# Patient Record
Sex: MALE | Race: WHITE | NOT HISPANIC OR LATINO | Employment: FULL TIME | URBAN - NONMETROPOLITAN AREA
[De-identification: names, ages, dates, MRNs, and addresses within clinical notes are randomized per-mention and may not be internally consistent; named-entity substitution may affect disease eponyms.]

---

## 2020-07-03 ENCOUNTER — VIRTUAL VISIT (OUTPATIENT)
Dept: FAMILY MEDICINE | Facility: OTHER | Age: 25
End: 2020-07-03
Attending: NURSE PRACTITIONER

## 2020-07-03 VITALS — HEIGHT: 74 IN | BODY MASS INDEX: 21.82 KG/M2 | WEIGHT: 170 LBS

## 2020-07-03 DIAGNOSIS — Z20.822 ENCOUNTER FOR LABORATORY TESTING FOR COVID-19 VIRUS: Primary | ICD-10-CM

## 2020-07-03 DIAGNOSIS — Z53.9 ERRONEOUS ENCOUNTER--DISREGARD: ICD-10-CM

## 2020-07-03 SDOH — HEALTH STABILITY: MENTAL HEALTH: HOW MANY STANDARD DRINKS CONTAINING ALCOHOL DO YOU HAVE ON A TYPICAL DAY?: 5 OR 6

## 2020-07-03 SDOH — HEALTH STABILITY: MENTAL HEALTH: HOW OFTEN DO YOU HAVE A DRINK CONTAINING ALCOHOL?: 2-4 TIMES A MONTH

## 2020-07-03 SDOH — HEALTH STABILITY: MENTAL HEALTH: HOW OFTEN DO YOU HAVE 6 OR MORE DRINKS ON ONE OCCASION?: MONTHLY

## 2020-07-03 ASSESSMENT — MIFFLIN-ST. JEOR: SCORE: 1835.86

## 2020-07-03 NOTE — NURSING NOTE
"Chief Complaint   Patient presents with     Suspected Covid     exposed at work, can't go back until tested         Initial Ht 1.88 m (6' 2\")   Wt 77.1 kg (170 lb)   BMI 21.83 kg/m   Estimated body mass index is 21.83 kg/m  as calculated from the following:    Height as of this encounter: 1.88 m (6' 2\").    Weight as of this encounter: 77.1 kg (170 lb).    Medication Reconciliation: complete      Doris Brown  "

## 2020-07-03 NOTE — PROGRESS NOTES
Unable to reach patient by phone to complete phone visit.  Visit cancelled.    This encounter was opened in error. Please disregard.  This encounter was opened in error. Please disregard.

## 2020-07-04 ENCOUNTER — ALLIED HEALTH/NURSE VISIT (OUTPATIENT)
Dept: FAMILY MEDICINE | Facility: OTHER | Age: 25
End: 2020-07-04
Attending: NURSE PRACTITIONER
Payer: OTHER GOVERNMENT

## 2020-07-04 VITALS — WEIGHT: 170 LBS | BODY MASS INDEX: 21.83 KG/M2

## 2020-07-04 DIAGNOSIS — Z20.822 COVID-19 RULED OUT: Primary | ICD-10-CM

## 2020-07-04 DIAGNOSIS — Z20.822 ENCOUNTER FOR LABORATORY TESTING FOR COVID-19 VIRUS: Primary | ICD-10-CM

## 2020-07-04 PROCEDURE — C9803 HOPD COVID-19 SPEC COLLECT: HCPCS

## 2020-07-04 PROCEDURE — 99207 ZZC NO CHARGE NURSE ONLY: CPT

## 2020-07-04 PROCEDURE — U0003 INFECTIOUS AGENT DETECTION BY NUCLEIC ACID (DNA OR RNA); SEVERE ACUTE RESPIRATORY SYNDROME CORONAVIRUS 2 (SARS-COV-2) (CORONAVIRUS DISEASE [COVID-19]), AMPLIFIED PROBE TECHNIQUE, MAKING USE OF HIGH THROUGHPUT TECHNOLOGIES AS DESCRIBED BY CMS-2020-01-R: HCPCS | Mod: ZL | Performed by: NURSE PRACTITIONER

## 2020-07-04 PROCEDURE — 99202 OFFICE O/P NEW SF 15 MIN: CPT | Mod: 95 | Performed by: NURSE PRACTITIONER

## 2020-07-04 ASSESSMENT — PAIN SCALES - GENERAL: PAINLEVEL: NO PAIN (0)

## 2020-07-04 NOTE — LETTER
July 5, 2020        Margaux Ramirez  [address] [place] [address]    This letter provides a written record that you were tested for COVID-19 on 7/4/20.       Your result was negative. This means that we didn t find the virus that causes COVID-19 in your sample. A test may show negative when you do actually have the virus. This can happen when the virus is in the early stages of infection, before you feel illness symptoms.    If you have symptoms   Stay home and away from others (self-isolate) until you meet ALL of the guidelines below:    You ve had no fever--and no medicine that reduces fever--for 3 full days (72 hours). And      Your other symptoms have gotten better. For example, your cough or breathing has improved. And     At least 10 days have passed since your symptoms started.    During this time:    Stay home. Don t go to work, school or anywhere else.     Stay in your own room, including for meals. Use your own bathroom if you can.    Stay away from others in your home. No hugging, kissing or shaking hands. No visitors.    Clean  high touch  surfaces often (doorknobs, counters, handles, etc.). Use a household cleaning spray or wipes. You can find a full list on the EPA website at www.epa.gov/pesticide-registration/list-n-disinfectants-use-against-sars-cov-2.    Cover your mouth and nose with a mask, tissue or washcloth to avoid spreading germs.    Wash your hands and face often with soap and water.    Going back to work  Check with your employer for any guidelines to follow for going back to work.    Employers: This document serves as formal notice that your employee tested negative for COVID-19, as of the testing date shown above.

## 2020-07-04 NOTE — PROGRESS NOTES
"Telephone Visit:  Needs COVID Testing to return to work due to a co-worker having a positive COVID Roommate.          Eliza Rose LPN 7/4/2020 9:09 AM      Margaux Ramirez is a 25 year old male who is being evaluated via a billable telephone visit.      The patient has been notified of following:     \"This telephone visit will be conducted via a call between you and your physician/provider. We have found that certain health care needs can be provided without the need for a physical exam.  This service lets us provide the care you need with a short phone conversation.  If a prescription is necessary we can send it directly to your pharmacy.  If lab work is needed we can place an order for that and you can then stop by our lab to have the test done at a later time.    Telephone visits are billed at different rates depending on your insurance coverage. During this emergency period, for some insurers they may be billed the same as an in-person visit.  Please reach out to your insurance provider with any questions.    If during the course of the call the physician/provider feels a telephone visit is not appropriate, you will not be charged for this service.\"    Patient has given verbal consent for Telephone visit?  Yes    What phone number would you like to be contacted at? [phone]    How would you like to obtain your AVS? Mail a copy    Subjective     Margaux Ramirez is a 25 year old male who presents via phone visit today for the following health issues:  Employer requested covid testing    HPI   States he was sent home from work on Thursday because a coworker's roommate was positive for Covid, still waiting for coworker's test result.  His employer is requesting everyone get tested prior to returning to work.  Patient is asymptomatic.  No fevers or chills.  No headaches or body aches.  No cough.  No chest congestion.  No chest tightness, heaviness or shortness of breath.  No sore throat.  No runny or stuffy " nose.  No change in appetite.  No fatigue.    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          There is no problem list on file for this patient.    History reviewed. No pertinent surgical history.    Social History     Tobacco Use     Smoking status: Current Every Day Smoker     Packs/day: 1.00     Years: 5.00     Pack years: 5.00     Types: Cigarettes     Smokeless tobacco: Never Used   Substance Use Topics     Alcohol use: Yes     Frequency: 2-4 times a month     Drinks per session: 5 or 6     Binge frequency: Monthly     History reviewed. No pertinent family history.      No current outpatient medications on file.     No Known Allergies    Reviewed and updated as needed this visit by Provider      Objective   Reported vitals:  Wt 77.1 kg (170 lb)   BMI 21.83 kg/m     healthy, alert, no distress and cooperative  PSYCH: Alert and oriented times 3; coherent speech, normal   rate and volume, able to articulate logical thoughts, able   to abstract reason, no tangential thoughts, no hallucinations   or delusions  His affect is normal and pleasant  RESP: No cough, no audible wheezing, able to talk in full sentences  Remainder of exam unable to be completed due to telephone visits    Diagnostic Test Results:  Labs reviewed in Epic  none       Assessment/Plan:  1. Encounter for laboratory testing for COVID-19 virus    - Asymptomatic COVID-19 Virus (Coronavirus) by PCR    Come to clinic for [facility] COVID-19 test, phone number provided and process discussed   Self quarantine until test results are available, if test negative then can return to work,  if test positive then continue home quarantine for minimum of 7 to 14 days.   Discussed warning signs/symptoms indicative of need to f/u    Phone call duration:  6 minutes    Irina Flores NP on 7/4/2020 at 10:15 AM

## 2020-07-04 NOTE — NURSING NOTE
Telephone Visit:  Needs COVID Testing to return to work due to a co-worker having a positive COVID Roommate.          Eliza Rose LPN 7/4/2020 9:09 AM

## 2020-07-05 LAB
SARS-COV-2 RNA SPEC QL NAA+PROBE: NOT DETECTED
SPECIMEN SOURCE: NORMAL

## 2020-07-07 ENCOUNTER — TELEPHONE (OUTPATIENT)
Dept: FAMILY MEDICINE | Facility: OTHER | Age: 25
End: 2020-07-07

## 2020-07-07 NOTE — LETTER
July 7, 2020        Margaux Ramirez  [address] [place] [address]    This letter provides a written record that you were tested for COVID-19 on 7/4/2020.       Your result was negative. This means that we didn t find the virus that causes COVID-19 in your sample. A test may show negative when you do actually have the virus. This can happen when the virus is in the early stages of infection, before you feel illness symptoms.    If you have symptoms   Stay home and away from others (self-isolate) until you meet ALL of the guidelines below:    You ve had no fever--and no medicine that reduces fever--for 3 full days (72 hours). And      Your other symptoms have gotten better. For example, your cough or breathing has improved. And     At least 10 days have passed since your symptoms started.    During this time:    Stay home. Don t go to work, school or anywhere else.     Stay in your own room, including for meals. Use your own bathroom if you can.    Stay away from others in your home. No hugging, kissing or shaking hands. No visitors.    Clean  high touch  surfaces often (doorknobs, counters, handles, etc.). Use a household cleaning spray or wipes. You can find a full list on the EPA website at www.epa.gov/pesticide-registration/list-n-disinfectants-use-against-sars-cov-2.    Cover your mouth and nose with a mask, tissue or washcloth to avoid spreading germs.    Wash your hands and face often with soap and water.    Going back to work  Check with your employer for any guidelines to follow for going back to work.    Employers: This document serves as formal notice that your employee tested negative for COVID-19, as of the testing date shown above.

## 2020-07-07 NOTE — TELEPHONE ENCOUNTER
Pt transferred from scheduling seeking results and letter for COVID-19 test.     Relayed negative results to Pt and left letter in envelope in Unit [address].  Pt appreciative.     Jade Aly RN  ....................  7/7/2020   12:16 PM

## 2021-01-18 ENCOUNTER — ALLIED HEALTH/NURSE VISIT (OUTPATIENT)
Dept: FAMILY MEDICINE | Facility: OTHER | Age: 26
End: 2021-01-18
Attending: FAMILY MEDICINE
Payer: OTHER GOVERNMENT

## 2021-01-18 DIAGNOSIS — R50.9 FEVER AND CHILLS: Primary | ICD-10-CM

## 2021-01-18 PROCEDURE — U0003 INFECTIOUS AGENT DETECTION BY NUCLEIC ACID (DNA OR RNA); SEVERE ACUTE RESPIRATORY SYNDROME CORONAVIRUS 2 (SARS-COV-2) (CORONAVIRUS DISEASE [COVID-19]), AMPLIFIED PROBE TECHNIQUE, MAKING USE OF HIGH THROUGHPUT TECHNOLOGIES AS DESCRIBED BY CMS-2020-01-R: HCPCS | Mod: ZL | Performed by: FAMILY MEDICINE

## 2021-01-18 PROCEDURE — C9803 HOPD COVID-19 SPEC COLLECT: HCPCS

## 2021-01-18 PROCEDURE — U0005 INFEC AGEN DETEC AMPLI PROBE: HCPCS | Mod: ZL | Performed by: FAMILY MEDICINE

## 2021-01-20 LAB
SARS-COV-2 RNA RESP QL NAA+PROBE: NOT DETECTED
SPECIMEN SOURCE: NORMAL

## 2021-01-21 ENCOUNTER — TELEPHONE (OUTPATIENT)
Dept: FAMILY MEDICINE | Facility: OTHER | Age: 26
End: 2021-01-21

## 2021-01-21 NOTE — TELEPHONE ENCOUNTER
covid test is not showing up on his MyChart and he needs the results.   Please call patient.  Thank you.  Kavya Deluca on 1/21/2021 at 11:30 AM

## 2021-01-21 NOTE — TELEPHONE ENCOUNTER
"Refer to note below. Contacted patient and informed My Chart status still pending and Covid 19 not detected. Writer offered to print copy and place at Unit [address] window. \"That would be great. Thank you\". Yris Wilks RN on 1/21/2021 at 11:40 AM       January 20, 2021        Margaux Ramirez  [address]  [place] [address]                  COVID-19 Virus PCR to [facility] - Result (no units)   Date Value   01/18/2021 Not Detected            This letter provides a written record that you were tested for COVID-19.     Your result was negative. This means that we didn t find the virus that causes COVID-19 in your sample. A test may show negative when you do actually have the virus. This can happen when the virus is in the early stages of infection, before you feel illness symptoms.     If you have symptoms   Stay home and away from others (self-isolate) until you meet ALL of the guidelines below:    You ve had no fever--and no medicine that reduces fever--for 1 full day (24 hours). And      Your other symptoms have gotten better. For example, your cough or breathing has improved. And     At least 10 days have passed since your symptoms started. (If you've been told by a doctor that you have a weak immune system, wait 20 days)     During this time:    Stay home. Don t go to work, school or anywhere else.     Stay in your own room, including for meals. Use your own bathroom if you can.    Stay away from others in your home. No hugging, kissing or shaking hands. No visitors.    Clean  high touch  surfaces often (doorknobs, counters, handles, etc.). Use a household cleaning spray or wipes. You can find a full list on the EPA website at www.epa.gov/pesticide-registration/list-n-disinfectants-use-against-sars-cov-2.    Cover your mouth and nose with a mask or other face covering to avoid spreading germs.    Wash your hands and face often with soap and water.     Going back to work  Check with your employer for any " guidelines to follow for going back to work.     Employers: This document serves as formal notice that your employee tested negative for COVID-19, as of the testing date shown above.

## 2021-04-05 ENCOUNTER — PATIENT OUTREACH (OUTPATIENT)
Dept: FAMILY MEDICINE | Facility: OTHER | Age: 26
End: 2021-04-05

## 2021-04-05 NOTE — TELEPHONE ENCOUNTER
Patient Quality Outreach      Summary:    Patient has the following on his problem list/HM:     Immunizations       Health Maintenance Due   Topic     Diptheria Tetanus Pertussis (DTAP/TDAP/TD) Vaccine (1 - Tdap)     Flu Vaccine (1)         Patient is due/failing the following:   Adult/Adolescent physical, date due: 2021 and Immunizations    Type of outreach:    Sent letter.    Questions for provider review:    None                                                                                                                                     Irina Flores NP on 4/5/2021 at 1:52 PM       Chart routed to N/A.

## 2021-04-05 NOTE — LETTER
April 5, 2021      Margaux Ramirez  [address]  [facility] [address]      Your healthcare team cares about your health. To provide you with the best care,   we have reviewed your chart and based on our findings, we see that you are due to:     - OTHER FOLLOW UP:  Office Visit for yearly physical exam, immunizations (due for tetanus and HPV), medication review, and labs.     If you have already completed these items, please contact the clinic via phone or   [facility] so your care team can review and update your records. Thank you for   choosing [facility] for your healthcare needs. For any questions,   concerns, or to schedule an appointment please contact the clinic.       Healthy Regards,      Your [facility] Care Team

## 2022-05-28 ENCOUNTER — HOSPITAL ENCOUNTER (EMERGENCY)
Facility: OTHER | Age: 27
Discharge: HOME | End: 2022-05-28
Attending: FAMILY MEDICINE | Admitting: FAMILY MEDICINE
Payer: COMMERCIAL

## 2022-05-28 VITALS
TEMPERATURE: 97.5 F | RESPIRATION RATE: 16 BRPM | HEIGHT: 73 IN | HEART RATE: 89 BPM | BODY MASS INDEX: 20.94 KG/M2 | OXYGEN SATURATION: 98 % | SYSTOLIC BLOOD PRESSURE: 127 MMHG | WEIGHT: 158 LBS | DIASTOLIC BLOOD PRESSURE: 62 MMHG

## 2022-05-28 DIAGNOSIS — M25.531 RIGHT WRIST PAIN: ICD-10-CM

## 2022-05-28 PROCEDURE — 99282 EMERGENCY DEPT VISIT SF MDM: CPT | Performed by: FAMILY MEDICINE

## 2022-05-28 NOTE — ED PROVIDER NOTES
"  History     Chief Complaint   Patient presents with     Arm Pain     Numbness     The history is provided by the patient.     Margaux Ramirez is a 27 year old male here with right wrist pain and numbness.  He has had symptoms for the past week. 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 The pain is in the right wrist and goes up the forearm. It is worse through the week and better on the weekend. He has not done this type of work before.    Allergies:  No Known Allergies    Problem List:    There are no problems to display for this patient.       Past Medical History:    No past medical history on file.    Past Surgical History:    No past surgical history on file.    Family History:    No family history on file.    Social History:  Marital Status:  Single [1]  Social History     Tobacco Use     Smoking status: Current Every Day Smoker     Packs/day: 1.00     Years: 5.00     Pack years: 5.00     Types: Cigarettes     Smokeless tobacco: Never Used   Substance Use Topics     Alcohol use: Yes     Drug use: Never        Medications:    No current outpatient medications on file.        Review of Systems   Musculoskeletal:        Right wrist and forearm pain and numbness   All other systems reviewed and are negative.      Physical Exam   BP: 127/62  Pulse: 89  Temp: 97.5  F (36.4  C)  Resp: 16  Height: 185.4 cm (6' 1\")  Weight: 71.7 kg (158 lb)  SpO2: 98 %      Physical Exam  Vitals reviewed.   Constitutional:       General: He is not in acute distress.     Appearance: Normal appearance. He is not ill-appearing.   Musculoskeletal:      Comments: Exam of the right arm shows no tenderness or tingling with Tinel and Phalen exam. He has no pain with deQuervain testing.   Neurological:      Mental Status: He is alert.         Assessments & Plan (with Medical Decision Making)  Margaux Ramirez is a 27 year old male here with " "right wrist pain and numbness.  He has had symptoms for the past week. 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 The pain is in the right wrist and goes up the forearm. It is worse through the week and better on the weekend. He has not done this type of work before.  VS in the ED on room air /62   Pulse 89   Temp 97.5  F (36.4  C) (Tympanic)   Resp 16   Ht 1.854 m (6' 1\")   Wt 71.7 kg (158 lb)   SpO2 98%   BMI 20.85 kg/m    Exam shows negative testing for carpal tunnel or deQuervain's but I think he has early carpal tunnel. This diagnosis fits with his symptoms and line of work. We will get him a wrist splint and a note for work but I think he needs to find a new job that does not include heavy vibrating tools like jaylan hammer or hammer drill.      I have reviewed the nursing notes.    I have reviewed the findings, diagnosis, plan and need for follow up with the patient.    Final diagnoses:   Right wrist pain       5/28/2022   [facility] AND [facility], Homer Yeung MD  05/28/22 1412    "

## 2022-05-28 NOTE — ED TRIAGE NOTES
Patient states for the past week his right forearm has been hurting. It is on the radial distal side of his arm. He experiences numbness to the area with associated numbness that radiates up to his shoulder. He denies any trauma, but has been using a jackhammer recently for work. He has not taken any medication for his symptoms, and rates his pain 5/10.

## 2022-05-28 NOTE — LETTER
May 28, 2022      To Whom It May Concern:      Margaux Ramirez was seen in our Emergency Department today, 05/28/22.  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  He may return to work if you are able to let him work jaylan hammer every other day so that his wrist has a chance to heal.    Sincerely,              Homer Chao MD

## 2022-05-28 NOTE — DISCHARGE INSTRUCTIONS
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  Any heavy vibration hand tool (jaylan hammer or hammer drill) will make this worse. I think you have early Carpal Tunnel Syndrome.    Thank you for choosing our Emergency Department for your care.     You may receive a phone call or letter for a survey about your care in our ED.  Please complete this as this is how we improve care for our patients.     If you have any questions after leaving the ED you can call me at [phone] during hours that I am working. I am working today until 7 PM.    Sincerely,    Dr Fahad Chao M.D.

## 2025-05-17 ENCOUNTER — HEALTH MAINTENANCE LETTER (OUTPATIENT)
Age: 30
End: 2025-05-17